# Patient Record
Sex: FEMALE | Race: WHITE | URBAN - METROPOLITAN AREA
[De-identification: names, ages, dates, MRNs, and addresses within clinical notes are randomized per-mention and may not be internally consistent; named-entity substitution may affect disease eponyms.]

---

## 2017-11-08 ENCOUNTER — OFFICE VISIT (OUTPATIENT)
Dept: DERMATOLOGY | Age: 67
End: 2017-11-08

## 2017-11-08 DIAGNOSIS — L30.0 NUMMULAR DERMATITIS: ICD-10-CM

## 2017-11-08 DIAGNOSIS — L57.0 AK (ACTINIC KERATOSIS): Primary | ICD-10-CM

## 2017-11-08 DIAGNOSIS — L71.9 ROSACEA: ICD-10-CM

## 2017-11-08 DIAGNOSIS — D22.9 BENIGN NEVUS: ICD-10-CM

## 2017-11-08 PROCEDURE — 1036F TOBACCO NON-USER: CPT | Performed by: DERMATOLOGY

## 2017-11-08 PROCEDURE — G8427 DOCREV CUR MEDS BY ELIG CLIN: HCPCS | Performed by: DERMATOLOGY

## 2017-11-08 PROCEDURE — 3017F COLORECTAL CA SCREEN DOC REV: CPT | Performed by: DERMATOLOGY

## 2017-11-08 PROCEDURE — G8484 FLU IMMUNIZE NO ADMIN: HCPCS | Performed by: DERMATOLOGY

## 2017-11-08 PROCEDURE — 99214 OFFICE O/P EST MOD 30 MIN: CPT | Performed by: DERMATOLOGY

## 2017-11-08 PROCEDURE — 3014F SCREEN MAMMO DOC REV: CPT | Performed by: DERMATOLOGY

## 2017-11-08 PROCEDURE — G8400 PT W/DXA NO RESULTS DOC: HCPCS | Performed by: DERMATOLOGY

## 2017-11-08 PROCEDURE — 17000 DESTRUCT PREMALG LESION: CPT | Performed by: DERMATOLOGY

## 2017-11-08 PROCEDURE — 1090F PRES/ABSN URINE INCON ASSESS: CPT | Performed by: DERMATOLOGY

## 2017-11-08 PROCEDURE — 1123F ACP DISCUSS/DSCN MKR DOCD: CPT | Performed by: DERMATOLOGY

## 2017-11-08 PROCEDURE — G8421 BMI NOT CALCULATED: HCPCS | Performed by: DERMATOLOGY

## 2017-11-08 PROCEDURE — 4040F PNEUMOC VAC/ADMIN/RCVD: CPT | Performed by: DERMATOLOGY

## 2017-11-08 RX ORDER — ASPIRIN 81 MG/1
81 TABLET ORAL 2 TIMES DAILY
COMMUNITY

## 2017-11-08 NOTE — PROGRESS NOTES
[facility]) Dermatology  Ronak Lujan M.D.  [phone]       Humza Jiménez  1950    79 y.o. female     Date of Visit: 11/8/2017    Chief Complaint:   Chief Complaint   Patient presents with    1 Year Follow Up     Yearly FBSE        I was asked to see this patient by Dr. Dumont ref. provider found. History of Present Illness:  1. Total body skin exam    New rough pink papule right temple. Dry. Not itching, bleeding, growing    Nummular dermatitis has been well controlled over the summer. Has really use triamcinolone 0.1% cream.  Tends to flare in the winter    Progressive erythema face and chest.  Multiple telangiectasias. Does not develop inflammatory papules. Sometimes wears hats and sunscreen    Skin history:  No personal history of skin cancer or dysplastic nevus  Nummular dermatitis-triamcinolone 0.1% cream  +AK- LN2     Grandmother with a history of basal cell carcinoma    Review of Systems:  Constitutional: Reports general sense of well-being       Past Medical History, Surgical History, Family History, Medications and Allergies reviewed. Social History:   Social History     Social History    Marital status:      Spouse name: N/A    Number of children: N/A    Years of education: N/A     Occupational History    Not on file. Social History Main Topics    Smoking status: Never Smoker    Smokeless tobacco: Never Used    Alcohol use Yes    Drug use: Unknown    Sexual activity: Not on file     Other Topics Concern    Not on file     Social History Narrative    No narrative on file       Physical Examination       -General: Well-appearing, NAD  Normal affect. Total body skin exam including scalp, face, neck, chest, abdomen, back, bilateral upper extremities, bilateral lower extremities, ocular conjunctiva, external lips, and nails was performed. Underwear area not examined.   Scattered on the trunk and extremities are multiple well-defined round and oval symmetric smoothly-bordered uniformly brown macules and papules. Gritty erythematous papule right temple. Background erythema with multiple telangiectasias face and chest.  No active inflammatory papules. No nummular dermatitis today      Assessment and Plan     1. AK (actinic keratosis) - 1-right temple lesion(s) treated w/ liquid nitrogen. Edu re: risk of blister formation, discomfort, scar, hypopigmentation. Discussed wound care. 2. Benign nevus - Benign acquired melanocytic nevi  -Recommend monthly self skin exams   -Educated regarding the ABCDEs of melanoma detection   -Call for any new/changing moles or concerning lesions  -Reviewed sun protective behavior -- sun avoidance during the peak hours of the day, sun-protective clothing (including hat and sunglasses), sunscreen use (water resistant, broad spectrum, SPF at least 30, need for reapplication every 2 to 3 hours), avoidance of tanning beds      3. Rosacea -Discussed laser. Topicals if she develops inflammatory papules. 4. Nummular dermatitis -Triamcinolone 0.1% cream b.i.d. as needed for flares, avoiding face.   Declines prescription as she has prescription at home

## 2018-10-03 ENCOUNTER — OFFICE VISIT (OUTPATIENT)
Dept: DERMATOLOGY | Age: 68
End: 2018-10-03
Payer: COMMERCIAL

## 2018-10-03 DIAGNOSIS — D22.9 BENIGN NEVUS: ICD-10-CM

## 2018-10-03 DIAGNOSIS — D48.5 NEOPLASM OF UNCERTAIN BEHAVIOR OF SKIN: Primary | ICD-10-CM

## 2018-10-03 DIAGNOSIS — D18.00 HEMANGIOMA: ICD-10-CM

## 2018-10-03 DIAGNOSIS — D23.5 OTHER BENIGN NEOPLASM OF SKIN OF TRUNK: ICD-10-CM

## 2018-10-03 PROCEDURE — 11100 PR BIOPSY OF SKIN LESION: CPT | Performed by: DERMATOLOGY

## 2018-10-03 PROCEDURE — 11300 SHAVE SKIN LESION 0.5 CM/<: CPT | Performed by: DERMATOLOGY

## 2018-10-03 PROCEDURE — 99213 OFFICE O/P EST LOW 20 MIN: CPT | Performed by: DERMATOLOGY

## 2018-10-03 PROCEDURE — 11101 PR BIOPSY, EACH ADDED LESION: CPT | Performed by: DERMATOLOGY

## 2018-10-05 LAB — DERMATOLOGY PATHOLOGY REPORT: NORMAL

## 2019-09-11 ENCOUNTER — TELEPHONE (OUTPATIENT)
Dept: DERMATOLOGY | Age: 69
End: 2019-09-11

## 2019-09-12 ENCOUNTER — OFFICE VISIT (OUTPATIENT)
Dept: DERMATOLOGY | Age: 69
End: 2019-09-12
Payer: COMMERCIAL

## 2019-09-12 DIAGNOSIS — L82.1 SEBORRHEIC KERATOSES: ICD-10-CM

## 2019-09-12 DIAGNOSIS — L82.0 SEBORRHEIC KERATOSES, INFLAMED: Primary | ICD-10-CM

## 2019-09-12 DIAGNOSIS — L71.9 ROSACEA: ICD-10-CM

## 2019-09-12 DIAGNOSIS — L30.0 NUMMULAR DERMATITIS: ICD-10-CM

## 2019-09-12 PROCEDURE — 17110 DESTRUCTION B9 LES UP TO 14: CPT | Performed by: DERMATOLOGY

## 2019-09-12 PROCEDURE — 99213 OFFICE O/P EST LOW 20 MIN: CPT | Performed by: DERMATOLOGY

## 2019-09-12 RX ORDER — METRONIDAZOLE 7.5 MG/G
GEL TOPICAL
Qty: 60 G | Refills: 3 | Status: SHIPPED | OUTPATIENT
Start: 2019-09-12 | End: 2021-05-24 | Stop reason: SDUPTHER

## 2019-09-12 RX ORDER — FLUOCINONIDE TOPICAL SOLUTION USP, 0.05% 0.5 MG/ML
SOLUTION TOPICAL
Qty: 60 ML | Refills: 3 | Status: SHIPPED | OUTPATIENT
Start: 2019-09-12 | End: 2021-05-24

## 2019-09-12 RX ORDER — TRIAMCINOLONE ACETONIDE 1 MG/G
CREAM TOPICAL
Qty: 60 G | Refills: 2 | Status: SHIPPED | OUTPATIENT
Start: 2019-09-12 | End: 2021-05-24

## 2019-11-13 ENCOUNTER — OFFICE VISIT (OUTPATIENT)
Dept: DERMATOLOGY | Age: 69
End: 2019-11-13
Payer: COMMERCIAL

## 2019-11-13 DIAGNOSIS — L71.9 ROSACEA: ICD-10-CM

## 2019-11-13 DIAGNOSIS — D48.5 NEOPLASM OF UNCERTAIN BEHAVIOR OF SKIN: Primary | ICD-10-CM

## 2019-11-13 DIAGNOSIS — L30.0 NUMMULAR DERMATITIS: ICD-10-CM

## 2019-11-13 DIAGNOSIS — D22.9 BENIGN NEVUS: ICD-10-CM

## 2019-11-13 PROCEDURE — 99214 OFFICE O/P EST MOD 30 MIN: CPT | Performed by: DERMATOLOGY

## 2019-11-13 PROCEDURE — 11102 TANGNTL BX SKIN SINGLE LES: CPT | Performed by: DERMATOLOGY

## 2019-11-18 ENCOUNTER — TELEPHONE (OUTPATIENT)
Dept: DERMATOLOGY | Age: 69
End: 2019-11-18

## 2019-11-18 DIAGNOSIS — C44.319 BASAL CELL CARCINOMA (BCC) OF RIGHT TEMPLE REGION: Primary | ICD-10-CM

## 2019-11-18 LAB — DERMATOLOGY PATHOLOGY REPORT: ABNORMAL

## 2019-12-03 ENCOUNTER — PROCEDURE VISIT (OUTPATIENT)
Dept: SURGERY | Age: 69
End: 2019-12-03
Payer: COMMERCIAL

## 2019-12-03 VITALS
WEIGHT: 152 LBS | SYSTOLIC BLOOD PRESSURE: 117 MMHG | DIASTOLIC BLOOD PRESSURE: 85 MMHG | OXYGEN SATURATION: 98 % | TEMPERATURE: 98 F | HEART RATE: 72 BPM

## 2019-12-03 DIAGNOSIS — C44.319 BASAL CELL CARCINOMA OF RIGHT TEMPLE REGION: Primary | ICD-10-CM

## 2019-12-03 PROBLEM — I87.2 VENOUS INSUFFICIENCY OF BOTH LOWER EXTREMITIES: Status: ACTIVE | Noted: 2019-04-22

## 2019-12-03 PROBLEM — M79.605 LEG PAIN, BILATERAL: Status: ACTIVE | Noted: 2019-04-22

## 2019-12-03 PROBLEM — R73.01 IFG (IMPAIRED FASTING GLUCOSE): Status: ACTIVE | Noted: 2017-01-30

## 2019-12-03 PROBLEM — L30.9 DERMATITIS OF MULTIPLE SITES: Status: ACTIVE | Noted: 2019-07-03

## 2019-12-03 PROBLEM — M79.604 LEG PAIN, BILATERAL: Status: ACTIVE | Noted: 2019-04-22

## 2019-12-03 PROBLEM — Z87.891 FORMER SMOKER: Status: ACTIVE | Noted: 2019-04-22

## 2019-12-03 PROCEDURE — 17311 MOHS 1 STAGE H/N/HF/G: CPT | Performed by: DERMATOLOGY

## 2019-12-03 PROCEDURE — 17312 MOHS ADDL STAGE: CPT | Performed by: DERMATOLOGY

## 2019-12-03 PROCEDURE — 12052 INTMD RPR FACE/MM 2.6-5.0 CM: CPT | Performed by: DERMATOLOGY

## 2019-12-04 ENCOUNTER — TELEPHONE (OUTPATIENT)
Dept: SURGERY | Age: 69
End: 2019-12-04

## 2021-05-24 ENCOUNTER — OFFICE VISIT (OUTPATIENT)
Dept: DERMATOLOGY | Age: 71
End: 2021-05-24
Payer: COMMERCIAL

## 2021-05-24 VITALS — TEMPERATURE: 98.4 F

## 2021-05-24 DIAGNOSIS — L24.9 IRRITANT DERMATITIS: ICD-10-CM

## 2021-05-24 DIAGNOSIS — D22.9 BENIGN NEVUS: ICD-10-CM

## 2021-05-24 DIAGNOSIS — L71.0 PERIORAL DERMATITIS: ICD-10-CM

## 2021-05-24 DIAGNOSIS — L82.0 SEBORRHEIC KERATOSES, INFLAMED: ICD-10-CM

## 2021-05-24 DIAGNOSIS — L71.9 ROSACEA: Primary | ICD-10-CM

## 2021-05-24 DIAGNOSIS — L30.0 NUMMULAR DERMATITIS: ICD-10-CM

## 2021-05-24 DIAGNOSIS — Z85.828 HISTORY OF BASAL CELL CARCINOMA: ICD-10-CM

## 2021-05-24 DIAGNOSIS — D48.5 NEOPLASM OF UNCERTAIN BEHAVIOR OF SKIN: ICD-10-CM

## 2021-05-24 PROCEDURE — 17110 DESTRUCTION B9 LES UP TO 14: CPT | Performed by: DERMATOLOGY

## 2021-05-24 PROCEDURE — 11103 TANGNTL BX SKIN EA SEP/ADDL: CPT | Performed by: DERMATOLOGY

## 2021-05-24 PROCEDURE — 11102 TANGNTL BX SKIN SINGLE LES: CPT | Performed by: DERMATOLOGY

## 2021-05-24 PROCEDURE — 99214 OFFICE O/P EST MOD 30 MIN: CPT | Performed by: DERMATOLOGY

## 2021-05-24 RX ORDER — METRONIDAZOLE 7.5 MG/G
GEL TOPICAL
Qty: 60 G | Refills: 3 | Status: SHIPPED | OUTPATIENT
Start: 2021-05-24 | End: 2022-10-05 | Stop reason: SDUPTHER

## 2021-05-24 NOTE — PROGRESS NOTES
[facility]) Dermatology  Nelson Mendez M.D.  [phone]       Adonis Del Angel  1950    79 y.o. female     Date of Visit: 5/24/2021    Chief Complaint:   Chief Complaint   Patient presents with    Skin Lesion     spots, tbse        I was asked to see this patient by Dr. Dumont ref. provider found. History of Present Illness:  1. Total-body skin exam    Irritant dermatitis on her chin after waxing-develops about once a month and uses a Chlortrimazole betamethasone combination for a few days with good improvement. Has now noticed erythema and scale around her mouth-comes and goes. Does have known rosacea on her nose that she had previously treated with MetroGel 0.75% but is out of her prescription. Does develop occasional inflammatory papules on her nose. Nummular dermatitis-currently flaring lower legs. Uses triamcinolone 0.1% cream twice daily as needed and has had fluocinonide solution for her scalp    Pigmented lesion left antecubital fossa and right shoulder-have both increased in size. Not itching, bleeding. Asymptomatic    Pruritic papule left back-raised, patient scratching at lesion    Skin history:  No personal history of skin cancer     10/18 right mid posterior upper arm dysplastic nevus with mild dysplasia  11/19 nodular basal cell carcinoma right temple status post Mohs     Nummular dermatitis-triamcinolone 0.1% cream, fluocinonide solution scalp  rosacea-MetroGel 0.75%  +AK- LN2     Grandmother with a history of basal cell carcinoma    Review of Systems:  Constitutional: Reports general sense of well-being       Past Medical History, Surgical History, Family History, Medications and Allergies reviewed.     Social History:   Social History     Socioeconomic History    Marital status:      Spouse name: Not on file    Number of children: Not on file    Years of education: Not on file    Highest education level: Not on file   Occupational History    Not on file   Tobacco Use    Smoking status: Former Smoker    Smokeless tobacco: Never Used    Tobacco comment: quit a long time ago   Vaping Use    Vaping Use: Never used   Substance and Sexual Activity    Alcohol use: Yes    Drug use: No    Sexual activity: Not on file   Other Topics Concern    Not on file   Social History Narrative    Not on file     Social Determinants of Health     Financial Resource Strain:     Difficulty of Paying Living Expenses:    Food Insecurity:     Worried About Running Out of Food in the Last Year:     [address] in the Last Year:    Transportation Needs:     Lack of Transportation (Medical):  Lack of Transportation (Non-Medical):    Physical Activity:     Days of Exercise per Week:     Minutes of Exercise per Session:    Stress:     Feeling of Stress :    Social Connections:     Frequency of Communication with Friends and Family:     Frequency of Social Gatherings with Friends and Family:     Attends [employer] Services:     Active Member of Clubs or Organizations:     Attends Club or Organization Meetings:     Marital Status:    Intimate Partner Violence:     Fear of Current or Ex-Partner:     Emotionally Abused:     Physically Abused:     Sexually Abused:        Physical Examination       -General: Well-appearing, NAD  1. Normal affect. Total body skin exam including scalp, face, neck, chest, abdomen, back, bilateral upper extremities, bilateral lower extremities, ocular conjunctiva, external lips, and nails was performed. Examination normal unless stated below. Underwear area not examined. Scattered on the trunk and extremities are multiple well-defined round and oval symmetric smoothly-bordered uniformly brown macules and papules. Mild background erythema of nose.   Erythematous papules and plaques lower cutaneous lip-perioral dermatitis  Erythematous scaly papules lower legs-nummular dermatitis  Well-healed scar at site of prior basal cell carcinoma    Left antecubital spectrum, SPF at least 30, need for reapplication every 2 to 3 hours), avoidance of tanning beds      7. History of basal cell carcinoma - No evidence of recurrence. Discussed risk of subsequent skin cancers and increased risk of melanoma. Reviewed importance of monitoring skin for change and sun protection with hats and sunscreen, as well as sun avoidance. 8.  Seborrheic keratosis, inflamed left back-1- lesion(s) treated w/ liquid nitrogen. Edu re: risk of blister formation, discomfort, scar, hypopigmentation. Discussed wound care. Errol Sykes

## 2021-05-26 LAB — DERMATOLOGY PATHOLOGY REPORT: NORMAL

## 2022-10-05 ENCOUNTER — OFFICE VISIT (OUTPATIENT)
Dept: DERMATOLOGY | Age: 72
End: 2022-10-05
Payer: COMMERCIAL

## 2022-10-05 DIAGNOSIS — L71.9 ROSACEA: Primary | ICD-10-CM

## 2022-10-05 DIAGNOSIS — D22.9 BENIGN NEVUS: ICD-10-CM

## 2022-10-05 DIAGNOSIS — Z85.828 HISTORY OF BASAL CELL CARCINOMA: ICD-10-CM

## 2022-10-05 DIAGNOSIS — L30.0 NUMMULAR DERMATITIS: ICD-10-CM

## 2022-10-05 PROCEDURE — 1123F ACP DISCUSS/DSCN MKR DOCD: CPT | Performed by: DERMATOLOGY

## 2022-10-05 PROCEDURE — 99214 OFFICE O/P EST MOD 30 MIN: CPT | Performed by: DERMATOLOGY

## 2022-10-05 RX ORDER — METRONIDAZOLE 7.5 MG/G
GEL TOPICAL
Qty: 60 G | Refills: 3 | Status: SHIPPED | OUTPATIENT
Start: 2022-10-05

## 2022-10-05 RX ORDER — MINOCYCLINE HYDROCHLORIDE 100 MG/1
CAPSULE ORAL
Qty: 60 CAPSULE | Refills: 1 | Status: SHIPPED | OUTPATIENT
Start: 2022-10-05

## 2022-10-05 NOTE — PROGRESS NOTES
[facility]) Dermatology  Veronika Narvaez M.D.  [phone]       Deandra Marsh  1950    70 y.o. female     Date of Visit: 10/5/2022    Chief Complaint:   Chief Complaint   Patient presents with    Skin Lesion        I was asked to see this patient by Dr. Dumont ref. provider found. History of Present Illness:  1. Tbse    Multiple nevi. Patient has not noticed any new or changing pigmented lesions. Stable in size, shape, color. Not itching, bleeding. Rosacea-flaring. Had a very stressful summer with multiple family members who . Did develop a flare of her rosacea-has continued MetroGel 0.75% twice daily. Continues to have multiple active inflammatory papules nose and cheeks. Nummular dermatitis responding well to fluocinonide cream.  Uses this twice daily only as needed      Skin history:  No personal history of skin cancer     10/18 right mid posterior upper arm dysplastic nevus with mild dysplasia   nodular basal cell carcinoma right temple status post Mohs     Nummular dermatitis-triamcinolone 0.1% cream, fluocinonide solution scalp  rosacea-MetroGel 0.75%  +AK- LN2     Grandmother with a history of basal cell carcinoma    Review of Systems:  Constitutional: Reports general sense of well-being       Past Medical History, Surgical History, Family History, Medications and Allergies reviewed.     Social History:   Social History     Socioeconomic History    Marital status:      Spouse name: Not on file    Number of children: Not on file    Years of education: Not on file    Highest education level: Not on file   Occupational History    Not on file   Tobacco Use    Smoking status: Former    Smokeless tobacco: Never    Tobacco comments:     quit a long time ago   Vaping Use    Vaping Use: Never used   Substance and Sexual Activity    Alcohol use: Yes    Drug use: No    Sexual activity: Not on file   Other Topics Concern    Not on file   Social History Narrative    Not on file     Social Determinants of Health     Financial Resource Strain: Not on file   Food Insecurity: Not on file   Transportation Needs: Not on file   Physical Activity: Not on file   Stress: Not on file   Social Connections: Not on file   Intimate Partner Violence: Not on file   Housing Stability: Not on file       Physical Examination       -General: Well-appearing, NAD  1. Normal affect. Total body skin exam including scalp, face, neck, chest, abdomen, back, bilateral upper extremities, bilateral lower extremities, ocular conjunctiva, external lips, and nails was performed. Examination normal unless stated below. Underwear area not examined. Scattered on the trunk and extremities are multiple well-defined round and oval symmetric smoothly-bordered uniformly brown macules and papules. Background erythema + 2+ amatory papules both cheeks      Assessment and Plan     1. Rosacea-start minocycline 100 mg p.o. twice daily for 1 month followed by 100 mg p.o. daily. Continue MetroGel 0.75% twice daily. Recheck 2 to 3 months-plan to discontinue minocycline at that point if clear. 2. Nummular dermatitis - Lidex cream twice daily for up to 2 weeks as needed for flares   3. Benign nevus - Benign acquired melanocytic nevi  -Recommend monthly self skin exams   -Educated regarding the ABCDEs of melanoma detection   -Call for any new/changing moles or concerning lesions  -Reviewed sun protective behavior -- sun avoidance during the peak hours of the day, sun-protective clothing (including hat and sunglasses), sunscreen use (water resistant, broad spectrum, SPF at least 30, need for reapplication every 2 to 3 hours), avoidance of tanning beds      4. History of basal cell carcinoma - No evidence of recurrence. Discussed risk of subsequent skin cancers and increased risk of melanoma. Reviewed importance of monitoring skin for change and sun protection with hats and sunscreen, as well as sun avoidance.

## 2022-12-12 ENCOUNTER — OFFICE VISIT (OUTPATIENT)
Dept: DERMATOLOGY | Age: 72
End: 2022-12-12
Payer: COMMERCIAL

## 2022-12-12 DIAGNOSIS — L71.9 ROSACEA: Primary | ICD-10-CM

## 2022-12-12 PROCEDURE — 1123F ACP DISCUSS/DSCN MKR DOCD: CPT | Performed by: DERMATOLOGY

## 2022-12-12 PROCEDURE — 99214 OFFICE O/P EST MOD 30 MIN: CPT | Performed by: DERMATOLOGY

## 2022-12-12 RX ORDER — SULFACETAMIDE SODIUM, SULFUR 90; 45 MG/G; MG/G
LIQUID TOPICAL
Qty: 454 G | Refills: 11 | Status: SHIPPED | OUTPATIENT
Start: 2022-12-12

## 2022-12-12 RX ORDER — IVERMECTIN 10 MG/G
CREAM TOPICAL
Qty: 45 G | Refills: 5 | Status: SHIPPED | OUTPATIENT
Start: 2022-12-12

## 2022-12-12 NOTE — PROGRESS NOTES
[facility]) Dermatology  Kt Reese M.D.  [phone]       Jackie Moralez  1950    67 y.o. female     Date of Visit: 12/12/2022    Chief Complaint:   Chief Complaint   Patient presents with    Rosacea     2-3 mo f/u -face, did not use Rx antibiotic due to history of cdiff  Could she benefit from another medication? I was asked to see this patient by Dr. Dumont ref. provider found. History of Present Illness:  1. Patient presents today for follow-up of rosacea-still developing inflammatory papules, scale, erythema. Using MetroGel 0.75% twice daily. Was prescribed minocycline at her last visit, filled the prescription, spent some time considering this and has decided not to proceed with minocycline due to her history of C. difficile. Very concerned about possibly developing C. difficile again. Does note a correlation with erythema if she has alcohol, otherwise inflammatory papules and scale seem relatively unrelated to diet or topicals. Does try to use gentle soaps and moisturizers regularly. Skin history:  No personal history of skin cancer     10/18 right mid posterior upper arm dysplastic nevus with mild dysplasia  11/19 nodular basal cell carcinoma right temple status post Mohs     Nummular dermatitis-triamcinolone 0.1% cream, fluocinonide solution scalp  rosacea-MetroGel 0.75%  +AK- LN2     Grandmother with a history of basal cell carcinoma       Review of Systems:  Constitutional: Reports general sense of well-being       Past Medical History, Surgical History, Family History, Medications and Allergies reviewed.     Social History:   Social History     Socioeconomic History    Marital status:      Spouse name: Not on file    Number of children: Not on file    Years of education: Not on file    Highest education level: Not on file   Occupational History    Not on file   Tobacco Use    Smoking status: Former    Smokeless tobacco: Never    Tobacco comments:     quit a long time ago   Vaping Use    Vaping Use: Never used   Substance and Sexual Activity    Alcohol use: Yes    Drug use: No    Sexual activity: Not on file   Other Topics Concern    Not on file   Social History Narrative    Not on file     Social Determinants of Health     Financial Resource Strain: Not on file   Food Insecurity: Not on file   Transportation Needs: Not on file   Physical Activity: Not on file   Stress: Not on file   Social Connections: Not on file   Intimate Partner Violence: Not on file   Housing Stability: Not on file       Physical Examination       -General: Well-appearing, NAD  1. Background erythema nose, cheeks, chin, nasolabial folds. Slight scale nasolabial folds. 1+ inflammatory papules      Assessment and Plan     1. Rosacea-reviewed treatment options. Try to avoid oral antibiotic. Start sodium sulfacetamide wash and ivermectin 1% cream.  Reviewed proper use and side effects. Discussed cost options.   Recheck 3 to 4 months sooner if needed

## 2023-12-13 ENCOUNTER — OFFICE VISIT (OUTPATIENT)
Dept: DERMATOLOGY | Age: 73
End: 2023-12-13

## 2023-12-13 DIAGNOSIS — D22.9 BENIGN NEVUS: ICD-10-CM

## 2023-12-13 DIAGNOSIS — L71.9 ROSACEA: ICD-10-CM

## 2023-12-13 DIAGNOSIS — L82.1 SEBORRHEIC KERATOSES: ICD-10-CM

## 2023-12-13 DIAGNOSIS — L50.9 URTICARIA: Primary | ICD-10-CM

## 2023-12-13 DIAGNOSIS — L82.0 SEBORRHEIC KERATOSES, INFLAMED: ICD-10-CM

## 2023-12-13 DIAGNOSIS — Z85.828 HISTORY OF BASAL CELL CARCINOMA: ICD-10-CM

## 2023-12-13 DIAGNOSIS — L30.0 NUMMULAR DERMATITIS: ICD-10-CM

## 2023-12-13 RX ORDER — PREDNISONE 20 MG/1
TABLET ORAL
COMMUNITY
Start: 2023-07-28

## 2023-12-13 RX ORDER — TRIAMCINOLONE ACETONIDE 1 MG/G
CREAM TOPICAL
Qty: 60 G | Refills: 2 | Status: CANCELLED | OUTPATIENT
Start: 2023-12-13

## 2023-12-13 NOTE — PROGRESS NOTES
[facility]) Dermatology  Enrique Foster M.D.  [phone]       Itzel Chacko  1950    68 y.o. female     Date of Visit: 12/13/2023    Chief Complaint:   Chief Complaint   Patient presents with    Skin Lesion        I was asked to see this patient by Dr. Dumont ref. provider found. History of Present Illness:  1. TBSE    Multiple nevi. Patient has not noticed any new or changing pigmented lesions. Stable in size, shape, color. Not itching, bleeding. Rosacea-changed to ivermectin 1% cream and Plexion wash after her last visit. Use both of these for about 3 weeks and rosacea completely cleared. Very happy with her improvement. Urticaria-following with allergy. Very pruritic. Has declined systemic treatment for now-has had urticaria about 4 months. Outbreaks seem to be spreading out-now will have about 2 weeks in between outbreaks. Still taking antihistamines. Current outbreak left lateral torso started this morning. Nummular dermatitis-has used triamcinolone 0.1% cream twice daily as needed during flares. This has been helpful. Documented allergy to prednisone-has had throat swelling at higher doses but has tolerated topicals and lower doses without difficulty. Skin history:  No personal history of skin cancer     10/18 right mid posterior upper arm dysplastic nevus with mild dysplasia  11/19 nodular basal cell carcinoma right temple status post Mohs     Nummular dermatitis-triamcinolone 0.1% cream, fluocinonide solution scalp  rosacea-MetroGel 0.75%. 10/22-rosacea flaring, wanted to avoid an oral antibiotic so changed to topical ivermectin 1% cream and Plexion wash with excellent improvement. +AK- LN2     Grandmother with a history of basal cell carcinoma    Review of Systems:  Constitutional: Reports general sense of well-being       Past Medical History, Surgical History, Family History, Medications and Allergies reviewed.     Social History:   Social History     Socioeconomic